# Patient Record
Sex: FEMALE | Race: WHITE | NOT HISPANIC OR LATINO | URBAN - METROPOLITAN AREA
[De-identification: names, ages, dates, MRNs, and addresses within clinical notes are randomized per-mention and may not be internally consistent; named-entity substitution may affect disease eponyms.]

---

## 2021-07-20 ENCOUNTER — APPOINTMENT (RX ONLY)
Dept: URBAN - METROPOLITAN AREA CLINIC 116 | Facility: CLINIC | Age: 26
Setting detail: DERMATOLOGY
End: 2021-07-20

## 2021-07-20 DIAGNOSIS — L63.8 OTHER ALOPECIA AREATA: ICD-10-CM

## 2021-07-20 DIAGNOSIS — L21.8 OTHER SEBORRHEIC DERMATITIS: ICD-10-CM

## 2021-07-20 PROBLEM — L65.9 NONSCARRING HAIR LOSS, UNSPECIFIED: Status: ACTIVE | Noted: 2021-07-20

## 2021-07-20 PROBLEM — L30.9 DERMATITIS, UNSPECIFIED: Status: ACTIVE | Noted: 2021-07-20

## 2021-07-20 PROCEDURE — 11104 PUNCH BX SKIN SINGLE LESION: CPT

## 2021-07-20 PROCEDURE — ? BIOPSY BY PUNCH METHOD

## 2021-07-20 PROCEDURE — ? PRESCRIPTION

## 2021-07-20 PROCEDURE — ? COUNSELING

## 2021-07-20 PROCEDURE — 11105 PUNCH BX SKIN EA SEP/ADDL: CPT

## 2021-07-20 RX ORDER — KETOCONAZOLE 20 MG/ML
SHAMPOO, SUSPENSION TOPICAL QD
Qty: 1 | Refills: 5 | Status: ERX | COMMUNITY
Start: 2021-07-20

## 2021-07-20 RX ADMIN — KETOCONAZOLE: 20 SHAMPOO, SUSPENSION TOPICAL at 00:00

## 2021-07-20 ASSESSMENT — LOCATION DETAILED DESCRIPTION DERM
LOCATION DETAILED: POSTERIOR MID-PARIETAL SCALP
LOCATION DETAILED: RIGHT INFERIOR OCCIPITAL SCALP
LOCATION DETAILED: LEFT SUPERIOR POSTERIOR NECK

## 2021-07-20 ASSESSMENT — LOCATION SIMPLE DESCRIPTION DERM
LOCATION SIMPLE: POSTERIOR SCALP
LOCATION SIMPLE: POSTERIOR NECK

## 2021-07-20 ASSESSMENT — LOCATION ZONE DERM
LOCATION ZONE: SCALP
LOCATION ZONE: NECK

## 2021-07-20 NOTE — PROCEDURE: BIOPSY BY PUNCH METHOD
Body Location Override (Optional - Billing Will Still Be Based On Selected Body Map Location If Applicable): posterior neck
Detail Level: Simple
Was A Bandage Applied: Yes
Punch Size In Mm: 3
Biopsy Type: H and E
Anesthesia Type: 1% lidocaine with epinephrine and a 1:10 solution of 8.4% sodium bicarbonate
Anesthesia Volume In Cc (Will Not Render If 0): 1
Additional Anesthesia Volume In Cc (Will Not Render If 0): 0
Hemostasis: Gelfoam
Epidermal Sutures: 4-0 Monocryl
Wound Care: Vaseline
Dressing: pressure dressing with telfa
Patient Will Remove Sutures At Home?: No
Lab: [id number] [facility] [address]
Path Notes (To The Dermatopathologist): Please have Dr. Erica Mcdonnell read
Consent: The provider's intent is to obtain a tissue sample solely for diagnostic purposes. Written consent to obtain tissue sample was obtained and risks were reviewed including but not limited to scarring, infection, bleeding, scabbing, incomplete removal, nerve damage and allergy to anesthesia.
Post-Care Instructions: I reviewed with the patient in detail post-care instructions. Patient is to keep the biopsy site dry overnight, and then apply bacitracin twice daily until healed. Patient may apply hydrogen peroxide soaks to remove any crusting.
Home Suture Removal Text: Patient was provided a home suture removal kit and will remove their sutures at home. If they have any questions or difficulties they will call the office.
Notification Instructions: Patient will be notified of biopsy results. However, patient instructed to call the office if not contacted within 2 weeks.
Billing Type: [employer]
Information: Selecting Yes will display possible errors in your note based on the variables you have selected. This validation is only offered as a suggestion for you. PLEASE NOTE THAT THE VALIDATION TEXT WILL BE REMOVED WHEN YOU FINALIZE YOUR NOTE. IF YOU WANT TO FAX A PRELIMINARY NOTE YOU WILL NEED TO TOGGLE THIS TO 'NO' IF YOU DO NOT WANT IT IN YOUR FAXED NOTE.
Lab: [facility] [address] [facility]
Lab Facility: [address] [facility]
Path Notes (To The Dermatopathologist): Please have Dr. Ivania Gee read
Consent: The provider's intent is to obtain a tissue sample solely for diagnostic purposes.  Written consent to obtain tissue sample was obtained and risks were reviewed including but not limited to scarring, infection, bleeding, scabbing, incomplete removal, nerve damage and allergy to anesthesia.
Home Suture Removal Text: Patient was provided a home suture removal kit and will remove their sutures at home.  If they have any questions or difficulties they will call the office.
Billing Type: Third-Party Bill

## 2021-07-20 NOTE — PROCEDURE: MIPS QUALITY
Detail Level: Zone
Quality 130: Documentation Of Current Medications In The Medical Record: Current Medications Documented
Additional Notes: Patient does not take any medications

## 2021-07-27 ENCOUNTER — APPOINTMENT (RX ONLY)
Dept: URBAN - METROPOLITAN AREA CLINIC 116 | Facility: CLINIC | Age: 26
Setting detail: DERMATOLOGY
End: 2021-07-27

## 2021-07-27 DIAGNOSIS — Z48.02 ENCOUNTER FOR REMOVAL OF SUTURES: ICD-10-CM

## 2021-07-27 PROCEDURE — ? SUTURE REMOVAL (GLOBAL PERIOD)

## 2021-07-27 PROCEDURE — 99024 POSTOP FOLLOW-UP VISIT: CPT

## 2021-07-27 ASSESSMENT — LOCATION ZONE DERM: LOCATION ZONE: SCALP

## 2021-07-27 ASSESSMENT — LOCATION SIMPLE DESCRIPTION DERM: LOCATION SIMPLE: POSTERIOR SCALP

## 2021-07-27 ASSESSMENT — LOCATION DETAILED DESCRIPTION DERM
LOCATION DETAILED: LEFT INFERIOR OCCIPITAL SCALP
LOCATION DETAILED: RIGHT INFERIOR OCCIPITAL SCALP

## 2021-07-27 NOTE — PROCEDURE: SUTURE REMOVAL (GLOBAL PERIOD)
Detail Level: Simple
Add 63517 Cpt? (Important Note: In 2017 The Use Of 34979 Is Being Tracked By Cms To Determine Future Global Period Reimbursement For Global Periods): yes

## 2021-08-02 ENCOUNTER — RX ONLY (OUTPATIENT)
Age: 26
Setting detail: RX ONLY
End: 2021-08-02

## 2021-08-02 RX ORDER — CLOBETASOL PROPIONATE 0.5 MG/ML
SOLUTION TOPICAL
Qty: 1 | Refills: 0 | Status: CANCELLED
Stop reason: CLARIF

## 2021-08-02 RX ORDER — KETOCONAZOLE 20 MG/ML
SHAMPOO, SUSPENSION TOPICAL
Qty: 1 | Refills: 2 | Status: CANCELLED
Stop reason: CLARIF